# Patient Record
Sex: MALE | Race: WHITE | URBAN - METROPOLITAN AREA
[De-identification: names, ages, dates, MRNs, and addresses within clinical notes are randomized per-mention and may not be internally consistent; named-entity substitution may affect disease eponyms.]

---

## 2019-05-01 ENCOUNTER — TELEPHONE (OUTPATIENT)
Dept: FAMILY MEDICINE CLINIC | Facility: CLINIC | Age: 49
End: 2019-05-01

## 2019-05-01 NOTE — TELEPHONE ENCOUNTER
Not seen since 2014. Patient overdue for annual physical and several preventative screenings. Please contact to schedule. Letter also sent.      Freddie Aquino DO  Family Medicine

## (undated) NOTE — LETTER
05/01/19  [facility] [address] [facility]      Dear Yin Mcintosh,    At [place] [id number] patient care is our priority. As your partner in your healthcare we promise to care for you, and provide quality care.      Our records